# Patient Record
Sex: FEMALE | Race: OTHER | HISPANIC OR LATINO | URBAN - METROPOLITAN AREA
[De-identification: names, ages, dates, MRNs, and addresses within clinical notes are randomized per-mention and may not be internally consistent; named-entity substitution may affect disease eponyms.]

---

## 2022-07-09 ENCOUNTER — TELEPHONE ENCOUNTER (OUTPATIENT)
Dept: URBAN - METROPOLITAN AREA CLINIC 121 | Facility: CLINIC | Age: 57
End: 2022-07-09

## 2022-07-09 RX ORDER — FLUTICASONE PROPIONATE 50 UG/1
SPRAY, METERED NASAL
Refills: 0 | OUTPATIENT
Start: 2012-07-28 | End: 2016-09-14

## 2022-07-09 RX ORDER — ACETAMINOPHEN, ASPIRIN (NSAID) AND CAFFEINE 250; 250; 65 MG/1; MG/1; MG/1
TABLET, FILM COATED ORAL
Refills: 0 | OUTPATIENT
Start: 2011-05-26 | End: 2011-06-17

## 2022-07-09 RX ORDER — OMEPRAZOLE 20 MG/1
CAPSULE, DELAYED RELEASE ORAL
Refills: 0 | OUTPATIENT
Start: 2012-07-26 | End: 2016-09-14

## 2022-07-09 RX ORDER — AMLODIPINE BESYLATE 5 MG/1
TABLET ORAL
Refills: 0 | OUTPATIENT
Start: 2019-11-09 | End: 2019-11-26

## 2022-07-09 RX ORDER — SUCRALFATE 1 G/1
TWICE A DAY TABLET ORAL TWICE A DAY
Refills: 0 | OUTPATIENT
Start: 2020-01-16 | End: 2020-02-13

## 2022-07-09 RX ORDER — LEVOTHYROXINE SODIUM 50 UG/1
TABLET ORAL ONCE A DAY
Refills: 0 | OUTPATIENT
Start: 2016-09-14 | End: 2019-11-26

## 2022-07-09 RX ORDER — ASPIRIN 81 MG/1
TABLET, DELAYED RELEASE ORAL
Refills: 0 | OUTPATIENT
Start: 2011-05-26 | End: 2011-06-17

## 2022-07-09 RX ORDER — CYCLOBENZAPRINE HYDROCHLORIDE 10 MG/1
TABLET, FILM COATED ORAL ONCE A DAY
Refills: 0 | OUTPATIENT
Start: 2016-09-14 | End: 2019-11-26

## 2022-07-09 RX ORDER — OMEPRAZOLE 20 MG/1
ONCE A DAY CAPSULE, DELAYED RELEASE ORAL ONCE A DAY
Refills: 1 | OUTPATIENT
Start: 2019-11-26 | End: 2020-01-16

## 2022-07-09 RX ORDER — LEVOTHYROXINE SODIUM 25 UG/1
TABLET ORAL
Refills: 0 | OUTPATIENT
Start: 2011-06-17 | End: 2011-09-07

## 2022-07-09 RX ORDER — LEVOTHYROXINE SODIUM 25 UG/1
TABLET ORAL
Refills: 0 | OUTPATIENT
Start: 2012-07-26 | End: 2016-09-14

## 2022-07-09 RX ORDER — LEVOTHYROXINE SODIUM 25 UG/1
TABLET ORAL
Refills: 0 | OUTPATIENT
Start: 2011-05-26 | End: 2011-06-17

## 2022-07-09 RX ORDER — LISINOPRIL 5 MG/1
TABLET ORAL
Refills: 0 | OUTPATIENT
Start: 2012-07-27 | End: 2016-09-14

## 2022-07-10 ENCOUNTER — TELEPHONE ENCOUNTER (OUTPATIENT)
Dept: URBAN - METROPOLITAN AREA CLINIC 121 | Facility: CLINIC | Age: 57
End: 2022-07-10

## 2022-07-10 RX ORDER — MONTELUKAST 10 MG/1
TABLET, FILM COATED ORAL
Refills: 0 | Status: ACTIVE | COMMUNITY
Start: 2019-11-26

## 2022-07-10 RX ORDER — METHYLCELLULOSE 2 G/10.2G
POWDER, FOR SOLUTION ORAL TAKE AS DIRECTED
Refills: 2 | Status: ACTIVE | COMMUNITY
Start: 2011-05-26

## 2022-07-10 RX ORDER — NAPROXEN SODIUM 220 MG
ONCE A DAY TABLET ORAL ONCE A DAY
Refills: 0 | Status: ACTIVE | COMMUNITY
Start: 2016-09-14

## 2022-07-10 RX ORDER — OMEPRAZOLE 40 MG/1
TWICE A DAY CAPSULE, DELAYED RELEASE ORAL TWICE A DAY
Refills: 0 | Status: ACTIVE | COMMUNITY
Start: 2016-09-14

## 2022-07-10 RX ORDER — OMEPRAZOLE 20 MG/1
TAKE ONE PILL 30 MIN BEFORE BF AND ONE PILL 30MIN DINNER CAPSULE, DELAYED RELEASE ORAL
Refills: 2 | Status: ACTIVE | COMMUNITY
Start: 2011-05-26

## 2022-07-10 RX ORDER — LEVOTHYROXINE SODIUM 75 UG/1
CAPSULE ORAL
Refills: 0 | Status: ACTIVE | COMMUNITY
Start: 2019-11-26

## 2022-07-10 RX ORDER — OMEPRAZOLE 20 MG/1
ONCE A DAY CAPSULE, DELAYED RELEASE ORAL ONCE A DAY
Refills: 1 | Status: ACTIVE | COMMUNITY
Start: 2020-01-16

## 2022-07-10 RX ORDER — AMLODIPINE BESYLATE 5 MG/1
TABLET ORAL
Refills: 0 | Status: ACTIVE | COMMUNITY
Start: 2019-11-26

## 2022-07-10 RX ORDER — FENOFIBRATE 54 MG/1
TABLET ORAL
Refills: 0 | Status: ACTIVE | COMMUNITY
Start: 2019-11-25

## 2022-07-10 RX ORDER — CYCLOBENZAPRINE HYDROCHLORIDE 10 MG/1
TABLET, FILM COATED ORAL ONCE A DAY
Refills: 0 | Status: ACTIVE | COMMUNITY
Start: 2019-11-26

## 2022-07-10 RX ORDER — SUCRALFATE 1 G/1
TAKE 1 TABLET BY MOUTH TWICE DAILY TABLET ORAL
Refills: 0 | Status: ACTIVE | COMMUNITY
Start: 2020-02-13

## 2022-07-10 RX ORDER — LEVOTHYROXINE SODIUM 25 UG/1
TABLET ORAL ONCE A DAY
Refills: 0 | Status: ACTIVE | COMMUNITY
Start: 2011-09-07

## 2022-07-10 RX ORDER — FAMOTIDINE 40 MG/1
TABLET, FILM COATED ORAL
Refills: 0 | Status: ACTIVE | COMMUNITY
Start: 2019-11-25

## 2022-08-31 ENCOUNTER — OFFICE VISIT (OUTPATIENT)
Dept: URBAN - METROPOLITAN AREA CLINIC 63 | Facility: CLINIC | Age: 57
End: 2022-08-31

## 2022-09-12 ENCOUNTER — OFFICE VISIT (OUTPATIENT)
Dept: URBAN - METROPOLITAN AREA CLINIC 63 | Facility: CLINIC | Age: 57
End: 2022-09-12

## 2022-11-22 ENCOUNTER — OFFICE VISIT (OUTPATIENT)
Dept: URBAN - METROPOLITAN AREA CLINIC 63 | Facility: CLINIC | Age: 57
End: 2022-11-22
Payer: COMMERCIAL

## 2022-11-22 ENCOUNTER — LAB OUTSIDE AN ENCOUNTER (OUTPATIENT)
Dept: URBAN - METROPOLITAN AREA CLINIC 63 | Facility: CLINIC | Age: 57
End: 2022-11-22

## 2022-11-22 VITALS
OXYGEN SATURATION: 98 % | WEIGHT: 169 LBS | HEIGHT: 62 IN | SYSTOLIC BLOOD PRESSURE: 124 MMHG | TEMPERATURE: 97.2 F | BODY MASS INDEX: 31.1 KG/M2 | HEART RATE: 90 BPM | DIASTOLIC BLOOD PRESSURE: 80 MMHG

## 2022-11-22 DIAGNOSIS — K21.00 GASTROESOPHAGEAL REFLUX DISEASE WITH ESOPHAGITIS WITHOUT HEMORRHAGE: ICD-10-CM

## 2022-11-22 DIAGNOSIS — E66.9 OBESITY, UNSPECIFIED: ICD-10-CM

## 2022-11-22 DIAGNOSIS — K76.0 FATTY (CHANGE OF) LIVER, NOT ELSEWHERE CLASSIFIED: ICD-10-CM

## 2022-11-22 PROBLEM — 266433003: Status: ACTIVE | Noted: 2022-11-22

## 2022-11-22 PROCEDURE — 99213 OFFICE O/P EST LOW 20 MIN: CPT | Performed by: INTERNAL MEDICINE

## 2022-11-22 RX ORDER — OMEPRAZOLE 40 MG/1
TWICE A DAY CAPSULE, DELAYED RELEASE ORAL TWICE A DAY
Refills: 0 | Status: ACTIVE | COMMUNITY
Start: 2016-09-14

## 2022-11-22 RX ORDER — OMEPRAZOLE 40 MG/1
1 CAPSULE 30 MINUTES BEFORE MORNING MEAL CAPSULE, DELAYED RELEASE ORAL ONCE A DAY
Qty: 30 | Refills: 1 | OUTPATIENT

## 2022-11-22 RX ORDER — LANSOPRAZOLE 30 MG/1
1 CAPSULE BEFORE A MEAL CAPSULE, DELAYED RELEASE ORAL ONCE A DAY
Status: ACTIVE | COMMUNITY

## 2022-11-22 RX ORDER — AMLODIPINE BESYLATE 5 MG/1
TABLET ORAL
Refills: 0 | Status: ACTIVE | COMMUNITY
Start: 2019-11-26

## 2022-11-22 RX ORDER — ATORVASTATIN CALCIUM 10 MG/1
1 TABLET TABLET, FILM COATED ORAL ONCE A DAY
Status: ACTIVE | COMMUNITY

## 2022-11-22 RX ORDER — SUCRALFATE 1 G/1
1 TABLET ON AN EMPTY STOMACH TABLET ORAL TWICE A DAY
Qty: 60 | Refills: 1 | OUTPATIENT

## 2022-11-22 RX ORDER — CYCLOBENZAPRINE HYDROCHLORIDE 10 MG/1
TABLET, FILM COATED ORAL ONCE A DAY
Refills: 0 | Status: ACTIVE | COMMUNITY
Start: 2019-11-26

## 2022-11-22 RX ORDER — LEVOTHYROXINE SODIUM 25 UG/1
TABLET ORAL ONCE A DAY
Refills: 0 | Status: ACTIVE | COMMUNITY
Start: 2011-09-07

## 2022-11-22 NOTE — HPI-HPI
11/19 Patient here today in good general state, patient is complaining of symptoms of gastritis and reflux, also pain over the right upper quadrant.  The pain is chronic, goes on and off, burning quality, is not complaining but any other GI symptoms.  Patient denied diarrhea, nausea, vomiting, constipation.  Last colonoscopy was done 2011.  Denies any medical history of colorectal cancer. No rectal bleeding. Plan: Patient will do diet and treatment for gastritis and reflux, and EGD She will have surveillance colonoscopy.  11/19 Patient here today in good general state, patient is complaining of symptoms of gastritis and reflux, also pain over the right upper quadrant.  The pain is chronic, goes on and off, burning quality, is not complaining but any other GI symptoms.  Patient denied diarrhea, nausea, vomiting, constipation.  Last colonoscopy was done 2011.  Denies any medical history of colorectal cancer. No rectal bleeding. Plan: Patient will do diet and treatment for gastritis and reflux, and EGD She will have surveillance colonoscopy. 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  No specimens were collected.  EGD shows evidence of acute and chronic gastritis, small H hernia.  Biopsy results shows normal duodenal mucosa.  Stomach and antrum biopsy showed mild chemical/reactive gastropathy.  Lower esophageal mucosa with mild surface activity.  Ultrasound of the abdomen shows evidence of fatty liver infiltrate with no evidence of focal liver masses. Plan : Patient will continue with diet and treatment for gastritis and reflux and liver work up. 11/22: Patient with  epigastric pain and burning sensation.  Will do treatment with PPI and carafate. Patient with symptoms of reflux, in am.  Will follow in 6 weeks.

## 2023-01-30 ENCOUNTER — OFFICE VISIT (OUTPATIENT)
Dept: URBAN - METROPOLITAN AREA CLINIC 63 | Facility: CLINIC | Age: 58
End: 2023-01-30
Payer: COMMERCIAL

## 2023-01-30 ENCOUNTER — WEB ENCOUNTER (OUTPATIENT)
Dept: URBAN - METROPOLITAN AREA CLINIC 63 | Facility: CLINIC | Age: 58
End: 2023-01-30

## 2023-01-30 ENCOUNTER — LAB OUTSIDE AN ENCOUNTER (OUTPATIENT)
Dept: URBAN - METROPOLITAN AREA CLINIC 63 | Facility: CLINIC | Age: 58
End: 2023-01-30

## 2023-01-30 ENCOUNTER — DASHBOARD ENCOUNTERS (OUTPATIENT)
Age: 58
End: 2023-01-30

## 2023-01-30 VITALS
SYSTOLIC BLOOD PRESSURE: 130 MMHG | OXYGEN SATURATION: 98 % | HEART RATE: 115 BPM | DIASTOLIC BLOOD PRESSURE: 84 MMHG | HEIGHT: 62 IN | BODY MASS INDEX: 31.58 KG/M2 | WEIGHT: 171.6 LBS | TEMPERATURE: 96.9 F

## 2023-01-30 DIAGNOSIS — K76.0 FATTY (CHANGE OF) LIVER, NOT ELSEWHERE CLASSIFIED: ICD-10-CM

## 2023-01-30 DIAGNOSIS — E66.9 OBESITY, UNSPECIFIED: ICD-10-CM

## 2023-01-30 PROCEDURE — 99214 OFFICE O/P EST MOD 30 MIN: CPT | Performed by: INTERNAL MEDICINE

## 2023-01-30 RX ORDER — OMEPRAZOLE 40 MG/1
1 CAPSULE 30 MINUTES BEFORE MORNING MEAL CAPSULE, DELAYED RELEASE ORAL ONCE A DAY
Qty: 30 | Refills: 1 | Status: ACTIVE | COMMUNITY

## 2023-01-30 RX ORDER — CYCLOBENZAPRINE HYDROCHLORIDE 10 MG/1
TABLET, FILM COATED ORAL ONCE A DAY
Refills: 0 | Status: ACTIVE | COMMUNITY
Start: 2019-11-26

## 2023-01-30 RX ORDER — ATORVASTATIN CALCIUM 10 MG/1
1 TABLET TABLET, FILM COATED ORAL ONCE A DAY
Status: ACTIVE | COMMUNITY

## 2023-01-30 RX ORDER — AMLODIPINE BESYLATE 5 MG/1
TABLET ORAL
Refills: 0 | Status: ACTIVE | COMMUNITY
Start: 2019-11-26

## 2023-01-30 RX ORDER — METFORMIN HYDROCHLORIDE 500 MG/1
1 TABLET WITH EVENING MEAL TABLET, EXTENDED RELEASE ORAL ONCE A DAY
Status: ACTIVE | COMMUNITY

## 2023-01-30 RX ORDER — LEVOTHYROXINE SODIUM 25 UG/1
TABLET ORAL ONCE A DAY
Refills: 0 | Status: ACTIVE | COMMUNITY
Start: 2011-09-07

## 2023-01-30 NOTE — HPI-HPI
11/19 Patient here today in good general state, patient is complaining of symptoms of gastritis and reflux, also pain over the right upper quadrant.  The pain is chronic, goes on and off, burning quality, is not complaining but any other GI symptoms.  Patient denied diarrhea, nausea, vomiting, constipation.  Last colonoscopy was done 2011.  Denies any medical history of colorectal cancer. No rectal bleeding. Plan: Patient will do diet and treatment for gastritis and reflux, and EGD She will have surveillance colonoscopy.  11/19 Patient here today in good general state, patient is complaining of symptoms of gastritis and reflux, also pain over the right upper quadrant.  The pain is chronic, goes on and off, burning quality, is not complaining but any other GI symptoms.  Patient denied diarrhea, nausea, vomiting, constipation.  Last colonoscopy was done 2011.  Denies any medical history of colorectal cancer. No rectal bleeding. Plan: Patient will do diet and treatment for gastritis and reflux, and EGD She will have surveillance colonoscopy. 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  No specimens were collected.  EGD shows evidence of acute and chronic gastritis, small H hernia.  Biopsy results shows normal duodenal mucosa.  Stomach and antrum biopsy showed mild chemical/reactive gastropathy.  Lower esophageal mucosa with mild surface activity.  Ultrasound of the abdomen shows evidence of fatty liver infiltrate with no evidence of focal liver masses. Plan : Patient will continue with diet and treatment for gastritis and reflux and liver work up. 11/22: Patient with  epigastric pain and burning sensation.  Will do treatment with PPI and carafate. Patient with symptoms of reflux, in am.  Will follow in 6 weeks. 1/23:

## 2023-01-30 NOTE — HPI-TODAY'S VISIT:
Patient is being seen for epigastric pain, an ultrasound was ordered with evidence of coarsened and increase hepatic echotexture indicative of diffuse hepatocellular disease such as fatty infiltration.  Moderate or severe steatosis, there is an increased risk of progression for steatohepatitis [facility] and is recommended to do elastography (FibroScan) there was no evidence of other abnormalities, will order FibroScan for evaluation of the severity of the steatosis and possible fibrosis of the liver.  Patient also with evidence of acid reflux on treatment with diet and medication, will continue treatment and will follow closely.  Patient also with history of acid reflux on diet and treatment will continue management.  Lipid panel showed that patient had hypertriglyceridemia of 223 we will need to do diet exercise and most likely medical treatment CBCs although wise normal, with normal AST and ALT and normal bilirubin.  We will follow the patient's in a few months.  Patient on atorvastatin, will need close monitoring with a lipid panel and adjustment of treatment per PCP.

## 2023-02-10 ENCOUNTER — TELEPHONE ENCOUNTER (OUTPATIENT)
Dept: URBAN - METROPOLITAN AREA CLINIC 63 | Facility: CLINIC | Age: 58
End: 2023-02-10

## 2023-02-15 ENCOUNTER — ERX REFILL RESPONSE (OUTPATIENT)
Dept: URBAN - METROPOLITAN AREA CLINIC 63 | Facility: CLINIC | Age: 58
End: 2023-02-15

## 2023-02-15 RX ORDER — OMEPRAZOLE 40 MG/1
TAKE 1 CAPSULE BY MOUTH EVERY DAY 30 MINUTES BEFORE BREAKFAST CAPSULE, DELAYED RELEASE ORAL
Qty: 90 CAPSULE | Refills: 0 | OUTPATIENT

## 2023-02-15 RX ORDER — OMEPRAZOLE 40 MG/1
1 CAPSULE 30 MINUTES BEFORE MORNING MEAL CAPSULE, DELAYED RELEASE ORAL ONCE A DAY
Qty: 30 | Refills: 1 | OUTPATIENT

## 2023-05-16 ENCOUNTER — OFFICE VISIT (OUTPATIENT)
Dept: URBAN - METROPOLITAN AREA CLINIC 63 | Facility: CLINIC | Age: 58
End: 2023-05-16

## 2023-08-15 ENCOUNTER — OFFICE VISIT (OUTPATIENT)
Dept: URBAN - METROPOLITAN AREA CLINIC 63 | Facility: CLINIC | Age: 58
End: 2023-08-15

## 2023-08-15 ENCOUNTER — TELEPHONE ENCOUNTER (OUTPATIENT)
Dept: URBAN - METROPOLITAN AREA CLINIC 63 | Facility: CLINIC | Age: 58
End: 2023-08-15

## 2023-08-15 RX ORDER — LEVOTHYROXINE SODIUM 25 UG/1
TABLET ORAL ONCE A DAY
Refills: 0 | Status: ACTIVE | COMMUNITY
Start: 2011-09-07

## 2023-08-15 RX ORDER — CYCLOBENZAPRINE HYDROCHLORIDE 10 MG/1
TABLET, FILM COATED ORAL ONCE A DAY
Refills: 0 | Status: ACTIVE | COMMUNITY
Start: 2019-11-26

## 2023-08-15 RX ORDER — METFORMIN HYDROCHLORIDE 500 MG/1
1 TABLET WITH EVENING MEAL TABLET, EXTENDED RELEASE ORAL ONCE A DAY
Status: ACTIVE | COMMUNITY

## 2023-08-15 RX ORDER — ATORVASTATIN CALCIUM 10 MG/1
1 TABLET TABLET, FILM COATED ORAL ONCE A DAY
Status: ACTIVE | COMMUNITY

## 2023-08-15 RX ORDER — AMLODIPINE BESYLATE 5 MG/1
TABLET ORAL
Refills: 0 | Status: ACTIVE | COMMUNITY
Start: 2019-11-26

## 2023-08-15 RX ORDER — OMEPRAZOLE 40 MG/1
TAKE 1 CAPSULE BY MOUTH EVERY DAY 30 MINUTES BEFORE BREAKFAST CAPSULE, DELAYED RELEASE ORAL
Qty: 90 CAPSULE | Refills: 0 | Status: ACTIVE | COMMUNITY

## 2023-11-14 ENCOUNTER — OFFICE VISIT (OUTPATIENT)
Dept: URBAN - METROPOLITAN AREA CLINIC 63 | Facility: CLINIC | Age: 58
End: 2023-11-14

## 2025-05-19 ENCOUNTER — P2P PATIENT RECORD (OUTPATIENT)
Age: 60
End: 2025-05-19